# Patient Record
Sex: MALE | Race: BLACK OR AFRICAN AMERICAN | NOT HISPANIC OR LATINO | Employment: OTHER | URBAN - METROPOLITAN AREA
[De-identification: names, ages, dates, MRNs, and addresses within clinical notes are randomized per-mention and may not be internally consistent; named-entity substitution may affect disease eponyms.]

---

## 2019-07-02 PROBLEM — T78.3XXA ANGIOEDEMA: Status: ACTIVE | Noted: 2017-08-12

## 2019-07-02 PROBLEM — E78.5 HYPERLIPIDEMIA: Status: ACTIVE | Noted: 2019-07-02

## 2019-07-02 PROBLEM — I10 ESSENTIAL HYPERTENSION: Status: ACTIVE | Noted: 2019-07-02

## 2023-01-18 PROBLEM — R73.03 PREDIABETES: Status: ACTIVE | Noted: 2023-01-18

## 2023-07-05 PROBLEM — G45.1 TIA INVOLVING CAROTID ARTERY: Status: ACTIVE | Noted: 2023-07-05

## 2023-07-05 PROBLEM — G45.9 TIA (TRANSIENT ISCHEMIC ATTACK): Status: ACTIVE | Noted: 2023-07-05

## 2023-07-05 PROBLEM — R55 SYNCOPE: Status: ACTIVE | Noted: 2023-07-05

## 2023-07-05 PROBLEM — I63.9 CVA (CEREBRAL VASCULAR ACCIDENT): Status: ACTIVE | Noted: 2023-07-05

## 2023-07-05 PROBLEM — R29.90 EPISODE OF TRANSIENT NEUROLOGIC SYMPTOMS: Status: ACTIVE | Noted: 2023-07-05

## 2023-07-07 ENCOUNTER — TELEPHONE (OUTPATIENT)
Dept: ADMINISTRATIVE | Facility: CLINIC | Age: 68
End: 2023-07-07

## 2023-07-07 DIAGNOSIS — E78.5 HYPERLIPIDEMIA, UNSPECIFIED HYPERLIPIDEMIA TYPE: ICD-10-CM

## 2023-07-07 RX ORDER — ATORVASTATIN CALCIUM 40 MG/1
40 TABLET, FILM COATED ORAL DAILY
Qty: 90 TABLET | Refills: 1 | Status: CANCELLED | OUTPATIENT
Start: 2023-07-07

## 2023-07-07 NOTE — TELEPHONE ENCOUNTER
----- Message from Maria Eugenia Mendoza PharmD sent at 2023 11:09 AM CDT -----  Regarding: Medication Request  Good morning. I'm with the post-discharge team.    Mr. Gordon was discharged from the hospital on 23, with instructions to continue taking atorvastatin 40 mg daily. But Mr. Gordon is out of this medication and the prescription on file is  and cannot be refilled. Mr. Gordon's daughter and legal guardian, Ms. Hu, is requesting that an e-prescription be sent to the preferred [facility] on file. Ms. Hu may be reached at [phone] with any follow-up questions or concerns.     Please note, this message was sent on the patient's behalf as a courtesy. Please do not reply to this message as this mailbox is not routinely monitored. Thanks.     Maria Eugenia

## 2023-07-07 NOTE — PROGRESS NOTES
"Phoned patient in response to reply of "2" to post-discharge texting tracker text, "Do you have all the medications and supplies your doctor prescribed for you at discharge? If not, reply 2 to this text so that we can help you get them."  Mr. Gordon's daughter, Jolene Hu, replied to the texting tracker to determine if Mr. Gordon received all medications at discharge. She states that she was out of town during his hospital stay and returned on yesterday. Mr. Gordon was instructed to start taking ASA, Plavix and Lipitor. But only the ASA and Plavix were ordered and Mr. Gordon does not have any Lipitor at home. Mr. Gordon had a prescription on file with 1 refill, but, per [facility], the prescription  in 2023. Sent messages to both the discharging provider and the PCP to request that an e-prescription be sent to the [facility] on file.  Message acknowledged by PCP's office, but order not yet sent. Phoned Ms. Hu with the update. Advised her to listen out for [facility] who will contact her when the RX is ready for pickup. Ms. Hu verbalized understanding.    "

## 2023-10-09 PROBLEM — I63.9 CVA (CEREBRAL VASCULAR ACCIDENT): Status: RESOLVED | Noted: 2023-07-05 | Resolved: 2023-10-09

## 2023-10-09 PROBLEM — G45.9 TIA (TRANSIENT ISCHEMIC ATTACK): Status: RESOLVED | Noted: 2023-07-05 | Resolved: 2023-10-09

## 2024-05-31 ENCOUNTER — PATIENT MESSAGE (OUTPATIENT)
Dept: ADMINISTRATIVE | Facility: HOSPITAL | Age: 69
End: 2024-05-31